# Patient Record
Sex: MALE | Race: WHITE | NOT HISPANIC OR LATINO | Employment: UNEMPLOYED | URBAN - METROPOLITAN AREA
[De-identification: names, ages, dates, MRNs, and addresses within clinical notes are randomized per-mention and may not be internally consistent; named-entity substitution may affect disease eponyms.]

---
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
## 2025-06-04 ENCOUNTER — OFFICE VISIT (OUTPATIENT)
Dept: INTERNAL MEDICINE | Facility: CLINIC | Age: 20
End: 2025-06-04
Payer: COMMERCIAL

## 2025-06-04 VITALS
TEMPERATURE: 98.2 F | SYSTOLIC BLOOD PRESSURE: 100 MMHG | HEART RATE: 76 BPM | HEIGHT: 67 IN | DIASTOLIC BLOOD PRESSURE: 66 MMHG | WEIGHT: 118 LBS | OXYGEN SATURATION: 100 % | BODY MASS INDEX: 18.52 KG/M2

## 2025-06-04 DIAGNOSIS — Z00.00 ANNUAL PHYSICAL EXAM: Primary | ICD-10-CM

## 2025-06-04 DIAGNOSIS — R68.82 DECREASED LIBIDO: ICD-10-CM

## 2025-06-04 DIAGNOSIS — F41.9 ANXIETY AND DEPRESSION: ICD-10-CM

## 2025-06-04 DIAGNOSIS — Z11.4 ENCOUNTER FOR SCREENING FOR HIV: ICD-10-CM

## 2025-06-04 DIAGNOSIS — S60.429A BLISTER OF FINGER, INITIAL ENCOUNTER: ICD-10-CM

## 2025-06-04 DIAGNOSIS — Z13.220 SCREENING FOR HYPERLIPIDEMIA: ICD-10-CM

## 2025-06-04 DIAGNOSIS — F32.A ANXIETY AND DEPRESSION: ICD-10-CM

## 2025-06-04 DIAGNOSIS — Z11.59 NEED FOR HEPATITIS C SCREENING TEST: ICD-10-CM

## 2025-06-04 RX ORDER — ALBUTEROL SULFATE 90 UG/1
2 INHALANT RESPIRATORY (INHALATION) EVERY 4 HOURS PRN
COMMUNITY

## 2025-06-04 RX ORDER — MONTELUKAST SODIUM 10 MG/1
10 TABLET ORAL DAILY
COMMUNITY

## 2025-06-04 RX ORDER — FLUTICASONE FUROATE 27.5 UG/1
SPRAY, METERED NASAL
COMMUNITY

## 2025-06-04 RX ORDER — CETIRIZINE HYDROCHLORIDE 10 MG/1
CAPSULE, LIQUID FILLED ORAL
COMMUNITY

## 2025-06-04 NOTE — PROGRESS NOTES
"  Isaías Costa DO, [facility]  Internal Medicine  [facility]  [address], [address]  [place] [address]  [phone]    Chief Complaint  Annual checkup    HISTORY    Phil Ritchie is a 20 y.o. male who presents to the office today as a  an established patient for their annual preventative exam.      No hospitalization(s) within the last year.     Current exercise regimen: weight lifting, cardio 6-7 days weekly.     Status of chronic medical conditions:    Allergies: takes zyrtec , flonase and montelukast as needed. Previously took immunotherapy.     Asthma: states predominantly in childhood. States he has never smoked or vaped. He has an as needed albuterol inhaler which he uses once a year if he gets a cold.     States he has had some anxiety and depression symptoms. This has been going on intermittently for a couple of years. Gotten worse over the last year. Never treated or diagnosed in the past. Has felt some depression and irritability. States he is not sure if his symptoms need a treatment. Does feel some \"ellen\" impact on day to day life. Has never done mental health counseling.     Health Maintenance Summary            Current Care Gaps       COVID-19 Vaccine (4 - 2024-25 season) Overdue since 9/1/2024      03/15/2022  Imm Admin: Covid-19 (Pfizer) Gray Cap    05/18/2021  Imm Admin: COVID-19 (PFIZER) Purple Cap Monovalent    04/27/2021  Imm Admin: COVID-19 (PFIZER) Purple Cap Monovalent              Pneumococcal Vaccine 0-49 (1 of 2 - PCV) Never done     No completion history exists for this topic.                      Awaiting Completion       HEPATITIS C SCREENING (Once) Order placed this encounter      06/04/2025  Order placed for Hepatitis C Antibody by Isaías Costa DO                      Upcoming       INFLUENZA VACCINE (Yearly - July to March) Next due on 7/1/2025      10/08/2024  Imm Admin: Influenza MDCK Quadrivalent with Preserative    10/10/2023  Imm Admin: Influenza " Injectable Mdck Pf Quad    10/13/2022  Imm Admin: Influenza MDCK Quadrivalent with Preserative    09/29/2021  Imm Admin: Influenza MDCK Quadrivalent with Preserative    10/19/2020  Imm Admin: Fluzone (or Fluarix & Flulaval for VFC) >6mos     Only the first 5 history entries have been loaded, but more history exists.            ANNUAL PHYSICAL (Yearly) Next due on 6/4/2026 06/04/2025  Done              TDAP/TD VACCINES (2 - Td or Tdap) Next due on 5/30/2034 05/30/2024  Imm Admin: Tdap                      Completed or No Longer Recommended       MENINGOCOCCAL VACCINE (Series Information) Completed      03/11/2019  Imm Admin: Meningococcal MCV4P (Menactra)              MENINGOCOCCAL B VACCINE  Discontinued      03/08/2021  Imm Admin: Trumenba(meningococcal B)    07/15/2020  Imm Admin: Trumenba(meningococcal B)                             Allergies   Allergen Reactions    Nuts Anaphylaxis    Azithromycin Hives     Hives on neck and ears        Outpatient Medications Marked as Taking for the 6/4/25 encounter (Office Visit) with Isaías Costa DO   Medication Sig Dispense Refill    albuterol sulfate  (90 Base) MCG/ACT inhaler Inhale 2 puffs Every 4 (Four) Hours As Needed for Wheezing.      Cetirizine HCl (ZyrTEC Allergy) 10 MG capsule       fluticasone (Flonase Sensimist) 27.5 MCG/SPRAY nasal spray Administer  into the nostril(s) as directed by provider.      montelukast (SINGULAIR) 10 MG tablet Take 1 tablet by mouth Daily.         Past Medical History:   Diagnosis Date    Allergies     Mild intermittent asthma     Peanut allergy      Past Surgical History:   Procedure Laterality Date    DENTAL PROCEDURE      teeth removal    WISDOM TOOTH EXTRACTION       Family History   Problem Relation Age of Onset    No Known Problems Mother     No Known Problems Father     Other (food allergies) Brother     Asthma Brother     Melanoma Other     Glaucoma Other     Macular degeneration Other     Irregular heart beat  "Other     Hypertension Other     Coronary artery disease Other     reports that he has never smoked. He has never used smokeless tobacco. He reports current alcohol use. He reports that he does not use drugs.    Immunization History   Administered Date(s) Administered    31-influenza Vac Quardvalent Preservativ 10/11/2018    COVID-19 (PFIZER) Purple Cap Monovalent 04/27/2021, 05/18/2021    Covid-19 (Pfizer) Gray Cap Monovalent 03/15/2022    Fluzone (or Fluarix & Flulaval for VFC) >6mos 10/18/2019, 10/19/2020    Hep A, 2 Dose 02/09/2018, 08/09/2018    Hpv9 07/15/2020, 10/19/2020, 03/08/2021    Influenza Injectable Mdck Pf Quad 10/10/2023    Influenza MDCK Quadrivalent with Preserative 09/29/2021, 10/13/2022, 10/08/2024    Meningococcal MCV4P (Menactra) 03/11/2019    Tdap 05/30/2024    Trumenba(meningococcal B) 07/15/2020, 03/08/2021        OBJECTIVE    Vital Signs:   /66   Pulse 76   Temp 98.2 °F (36.8 °C) (Infrared)   Ht 168.9 cm (66.5\")   Wt 53.5 kg (118 lb)   SpO2 100%   BMI 18.76 kg/m²     Physical Exam  Vitals reviewed.   Constitutional:       General: He is not in acute distress.     Appearance: Normal appearance. He is not ill-appearing.   HENT:      Head: Normocephalic and atraumatic.      Right Ear: Tympanic membrane, ear canal and external ear normal. There is no impacted cerumen.      Left Ear: Tympanic membrane, ear canal and external ear normal. There is no impacted cerumen.      Mouth/Throat:      Mouth: Mucous membranes are moist.      Pharynx: No oropharyngeal exudate or posterior oropharyngeal erythema.   Eyes:      General: No scleral icterus.     Extraocular Movements: Extraocular movements intact.      Conjunctiva/sclera: Conjunctivae normal.      Pupils: Pupils are equal, round, and reactive to light.   Cardiovascular:      Rate and Rhythm: Normal rate and regular rhythm.      Heart sounds: Normal heart sounds. No murmur heard.  Pulmonary:      Effort: Pulmonary effort is normal. No " respiratory distress.      Breath sounds: Normal breath sounds. No wheezing.   Abdominal:      General: Bowel sounds are normal. There is no distension.      Palpations: Abdomen is soft.      Tenderness: There is no abdominal tenderness. There is no guarding.   Musculoskeletal:      Cervical back: Neck supple.      Right lower leg: No edema.      Left lower leg: No edema.   Lymphadenopathy:      Cervical: No cervical adenopathy.   Skin:     General: Skin is warm and dry.      Coloration: Skin is not jaundiced.   Neurological:      General: No focal deficit present.      Mental Status: He is alert and oriented to person, place, and time.      Cranial Nerves: No cranial nerve deficit.      Motor: No weakness.   Psychiatric:         Mood and Affect: Mood normal.         Behavior: Behavior normal.         Thought Content: Thought content normal.                   The ASCVD Risk score (Cecilia KERR, et al., 2019) failed to calculate for the following reasons:    The 2019 ASCVD risk score is only valid for ages 40 to 79           ASSESSMENT & PLAN     Annual Preventative Health Examination  -Age and sex appropriate physical exam performed and documented. Updated past medical, family, social and surgical histories as well as allergies and care team list. Addressed care gaps listed in the medical record.  -Encouraged annual dental and vision exams as part of their overall health.  -Encouraged minimum of 30 minutes or more of exercise at a brisk walk or higher 5 days per week combined with a well-balanced diet.   -Immunizations reviewed and updated in EMR. Influenza, Prevnar 20 (Pneumococcal 20-valent conjugate), and COVID19 recommended.  -Lipid screening:  Will screen for hyperlipidemia today and calculate ASCVD risk if appropriate.    -Aspirin for primary or secondary prevention: Not applicable, patient is less than age 50.  -Depression and Anxiety screening: positive screen today, see plan below  -Diabetes screening:  "Screening not indicated at this time.   -Tobacco use screening: Conducted and addressed if indicated.   -Alcohol use screening: Conducted and addressed if indicated.   -Illicit drug screening: Conducted and addressed if indicated.   -Abdominal aortic aneurysm screening: AAA screening is not indicated as patient is less than 65 years of age.  -Hypertension screening: Patient screened negative for HTN today.  -HIV screening: Will obtain one time HIV screen today.  -Hepatitis C virus screening:  Will obtain one time screening today.  -Syphilis screening: Syphilis screening not indicated.  -Hepatitis B virus screening: Screening not indicated, not in a high-risk group.  -Colon cancer screening: Patient is less than age 45 and colon cancer screening is not indicated.  -Lung cancer screening: Patient is less than age 50, screening not indicated.  -Prostate cancer screening: Not applicable, patient is less than 50 years old.      Follow up in 1 year for annual physical exam.    Patient/family had no further questions at this time and verbalized understanding of the plan discussed today.     A problem-based visit was also conducted on the same day, see below for assessment and plan    Diagnoses and all orders for this visit:    1. Anxiety and depression (Primary)  -States he has had some anxiety and depression symptoms. This has been going on intermittently for a couple of years. Gotten worse over the last year. Never treated or diagnosed in the past. Has felt some depression and irritability. States he is not sure if his symptoms need a treatment. Does feel some \"ellen\" impact on day to day life. Has never done mental health counseling.   -PHQ9 of 7 and GAD7 of 9  -advised pt that I believe his symptoms and duration are consistent with a diagnosis of anxiety and depression  -we discussed medication vs nonmedication options. Discussed SSRIs as the preferred first line agents. Discussed common SSRI side effects including " nausea/constipation/diarrhea especially at the beginning of treatment , change in sleep, decreased sex drive, potential for weight gain, headache. Discussed that most patients take the medication without side effect.   -Stressed the importance h mental health counseling in treatment of both anxiety and depression. Provided pt a listing of in person and online mental health providers and strongly encouraged they reach out to make an appointment.   -check TSH  -at this point he would like to do a trial of counseling prior to medication therapy. I advised him to contact me immediately if he changes his mind regarding treatment or if he fails to obtain benefit from therapy.    2. Blister of 2nd right finger   -medial aspect of right  2nd finger   -states this was due to a burn in the kitchen   -at this point there is no surrounding skin necrosis or erythema , only a blister approximately 5 cm in length and 1 cm in width. I recommend he allow it to open on its own and once it does keep it covered with nonadhesive gauze with a thin layer of vaseline under.     3. Decreased libido   -he is curious if he has low testosterone. States he has little to no interest in sex. I will obtain a testosterone level today as well as TSH. This could also be a component of anxiety/depression.        The following social determinates of health impact the patient's medical decision making: No social determinates of health were factored in to today's visit.     Follow Up  Return in about 1 year (around 6/4/2026) for Annual physical.

## 2025-06-04 NOTE — PROGRESS NOTES
"  [facility], [facility]  [facility]  [facility]  [address], [address]  [place] [address]  [phone]    Chief Complaint  Establish Care    SUBJECTIVE    History of Present Illness    Phil Ritchie is a 20 y.o. male who presents to the office today as {new/est:54256}       Allergies   Allergen Reactions   • Nuts Anaphylaxis   • Azithromycin Hives     Hives on neck and ears        Outpatient Medications Marked as Taking for the 6/4/25 encounter (Office Visit) with Isaías Costa DO   Medication Sig Dispense Refill   • Cetirizine HCl (ZyrTEC Allergy) 10 MG capsule      • fluticasone (Flonase Sensimist) 27.5 MCG/SPRAY nasal spray Administer  into the nostril(s) as directed by provider.     • montelukast (SINGULAIR) 10 MG tablet Take 1 tablet by mouth Daily.          {new/est histories:11661}    OBJECTIVE    Vital Signs:   /66   Pulse 76   Temp 98.2 °F (36.8 °C) (Infrared)   Ht 168.9 cm (66.5\")   Wt 53.5 kg (118 lb)   SpO2 100%   BMI 18.76 kg/m²        Physical Exam       {The following data was reviewed by (Optional):42774}  {Ambulatory Labs (Optional):13055}  {Data reviewed (Optional):48870:::1}             ASSESSMENT & PLAN     Diagnoses and all orders for this visit:    1. Screening for hyperlipidemia (Primary)    2. Encounter for screening for HIV    3. Need for hepatitis C screening test        {Time Spent (Optional):50658}    Follow Up  No follow-ups on file.    Patient/family had no further questions at this time and verbalized understanding of the plan discussed today.   "

## 2025-06-05 LAB
ALBUMIN SERPL-MCNC: 5.1 G/DL (ref 4.3–5.2)
ALP SERPL-CCNC: 43 IU/L (ref 44–121)
ALT SERPL-CCNC: 31 IU/L (ref 0–44)
AST SERPL-CCNC: 37 IU/L (ref 0–40)
BASOPHILS # BLD AUTO: NORMAL 10*3/UL
BILIRUB SERPL-MCNC: 0.4 MG/DL (ref 0–1.2)
BUN SERPL-MCNC: 21 MG/DL (ref 6–20)
BUN/CREAT SERPL: 18 (ref 9–20)
CALCIUM SERPL-MCNC: 10.2 MG/DL (ref 8.7–10.2)
CHLORIDE SERPL-SCNC: 102 MMOL/L (ref 96–106)
CHOLEST SERPL-MCNC: NORMAL MG/DL
CO2 SERPL-SCNC: 22 MMOL/L (ref 20–29)
CREAT SERPL-MCNC: 1.16 MG/DL (ref 0.76–1.27)
EGFRCR SERPLBLD CKD-EPI 2021: 91 ML/MIN/1.73
EOSINOPHIL # BLD AUTO: NORMAL 10*3/UL
EOSINOPHIL NFR BLD AUTO: NORMAL %
GLOBULIN SER CALC-MCNC: 2.2 G/DL (ref 1.5–4.5)
GLUCOSE SERPL-MCNC: 91 MG/DL (ref 70–99)
HCT VFR BLD AUTO: NORMAL %
HCV IGG SERPL QL IA: NON REACTIVE
HDLC SERPL-MCNC: NORMAL MG/DL
HGB BLD-MCNC: NORMAL G/DL
HIV 1+2 AB+HIV1 P24 AG SERPL QL IA: NON REACTIVE
LYMPHOCYTES # BLD AUTO: NORMAL 10*3/UL
LYMPHOCYTES NFR BLD AUTO: NORMAL %
MONOCYTES NFR BLD AUTO: NORMAL %
NEUTROPHILS NFR BLD AUTO: NORMAL %
PLATELET # BLD AUTO: NORMAL 10*3/UL
POTASSIUM SERPL-SCNC: 4.5 MMOL/L (ref 3.5–5.2)
PROT SERPL-MCNC: 7.3 G/DL (ref 6–8.5)
RBC # BLD AUTO: NORMAL 10*6/UL
REQUEST PROBLEM: NORMAL
SODIUM SERPL-SCNC: 139 MMOL/L (ref 134–144)
TESTOST SERPL-MCNC: 86 NG/DL (ref 264–916)
TRIGL SERPL-MCNC: NORMAL MG/DL
TSH SERPL DL<=0.005 MIU/L-ACNC: 1.7 UIU/ML (ref 0.45–4.5)
VLDLC SERPL CALC-MCNC: NORMAL MG/DL
WBC # BLD AUTO: NORMAL X10E3/UL

## 2025-06-06 LAB
FERRITIN SERPL-MCNC: 283 NG/ML (ref 30–400)
FOLATE SERPL-MCNC: 12 NG/ML
INTERPRETATION: NORMAL
IRON SATN MFR SERPL: 40 % (ref 15–55)
IRON SERPL-MCNC: 118 UG/DL (ref 38–169)
TIBC SERPL-MCNC: 298 UG/DL (ref 250–450)
UIBC SERPL-MCNC: 180 UG/DL (ref 111–343)
VIT B12 SERPL-MCNC: 961 PG/ML (ref 232–1245)
WRITTEN AUTHORIZATION: NORMAL

## 2025-08-01 ENCOUNTER — HOSPITAL ENCOUNTER (OUTPATIENT)
Dept: MRI IMAGING | Facility: HOSPITAL | Age: 20
Discharge: HOME | End: 2025-08-01
Admitting: STUDENT IN AN ORGANIZED HEALTH CARE EDUCATION/TRAINING PROGRAM
Payer: COMMERCIAL

## 2025-08-01 DIAGNOSIS — R79.89 LOW TESTOSTERONE: ICD-10-CM

## 2025-08-01 DIAGNOSIS — R79.89 HIGH SERUM CORTISOL: ICD-10-CM

## 2025-08-01 PROCEDURE — 25510000002 GADOBENATE DIMEGLUMINE 529 MG/ML SOLUTION: Performed by: STUDENT IN AN ORGANIZED HEALTH CARE EDUCATION/TRAINING PROGRAM

## 2025-08-01 PROCEDURE — 70553 MRI BRAIN STEM W/O & W/DYE: CPT

## 2025-08-01 PROCEDURE — A9577 INJ MULTIHANCE: HCPCS | Performed by: STUDENT IN AN ORGANIZED HEALTH CARE EDUCATION/TRAINING PROGRAM

## 2025-08-01 RX ADMIN — GADOBENATE DIMEGLUMINE 10 ML: 529 INJECTION, SOLUTION INTRAVENOUS at 20:47

## 2025-08-29 ENCOUNTER — OFFICE VISIT (OUTPATIENT)
Dept: NEUROSURGERY | Facility: CLINIC | Age: 20
End: 2025-08-29
Payer: COMMERCIAL

## 2025-08-29 VITALS
BODY MASS INDEX: 19.49 KG/M2 | HEIGHT: 66 IN | HEART RATE: 53 BPM | TEMPERATURE: 97.8 F | DIASTOLIC BLOOD PRESSURE: 60 MMHG | OXYGEN SATURATION: 99 % | WEIGHT: 121.3 LBS | SYSTOLIC BLOOD PRESSURE: 100 MMHG

## 2025-08-29 DIAGNOSIS — E23.7 PITUITARY LESION: Primary | ICD-10-CM

## 2025-08-29 PROCEDURE — 99204 OFFICE O/P NEW MOD 45 MIN: CPT | Performed by: NEUROLOGICAL SURGERY
